# Patient Record
Sex: MALE | Race: ASIAN | NOT HISPANIC OR LATINO
[De-identification: names, ages, dates, MRNs, and addresses within clinical notes are randomized per-mention and may not be internally consistent; named-entity substitution may affect disease eponyms.]

---

## 2022-03-08 PROBLEM — Z00.00 ENCOUNTER FOR PREVENTIVE HEALTH EXAMINATION: Status: ACTIVE | Noted: 2022-03-08

## 2022-03-09 ENCOUNTER — APPOINTMENT (OUTPATIENT)
Dept: CARDIOLOGY | Facility: CLINIC | Age: 67
End: 2022-03-09
Payer: MEDICARE

## 2022-03-09 VITALS
RESPIRATION RATE: 18 BRPM | BODY MASS INDEX: 23.5 KG/M2 | HEIGHT: 70.47 IN | OXYGEN SATURATION: 96 % | HEART RATE: 88 BPM | DIASTOLIC BLOOD PRESSURE: 84 MMHG | TEMPERATURE: 97.3 F | WEIGHT: 166 LBS | SYSTOLIC BLOOD PRESSURE: 137 MMHG

## 2022-03-09 DIAGNOSIS — I10 ESSENTIAL (PRIMARY) HYPERTENSION: ICD-10-CM

## 2022-03-09 DIAGNOSIS — R07.89 OTHER CHEST PAIN: ICD-10-CM

## 2022-03-09 PROCEDURE — 99204 OFFICE O/P NEW MOD 45 MIN: CPT

## 2022-03-10 NOTE — REASON FOR VISIT
[FreeTextEntry1] : 66 year-old male with HTN, HLD presents for evaluation of abnormal ECG. Patient reports that 2 weeks ago he felt upper CP with inspiration, not related to exertion, pain was tolerable and vague. Patient denies SOB while walking but reports that he had FARIAS when he had stress test in the past. Patient denies palpitations. Patient denies h/o syncope. Patient saw PCP 3 days later, ECG showed NSR, LAD. Patient reports he has leg numbness and tightness and cannot walk well and uses a cane. He has seen neurologist without diagnosis. I advised patient to undergo an echocardiogram. I advised patient to get nuclear stress test pending insurance authorization.

## 2022-03-28 PROBLEM — I10 HTN (HYPERTENSION): Status: ACTIVE | Noted: 2022-03-28

## 2022-03-28 PROBLEM — R07.89 CHEST DISCOMFORT: Status: ACTIVE | Noted: 2022-03-23

## 2022-03-29 ENCOUNTER — APPOINTMENT (OUTPATIENT)
Dept: CARDIOLOGY | Facility: CLINIC | Age: 67
End: 2022-03-29
Payer: MEDICARE

## 2022-03-29 VITALS — DIASTOLIC BLOOD PRESSURE: 82 MMHG | SYSTOLIC BLOOD PRESSURE: 138 MMHG | TEMPERATURE: 97.6 F

## 2022-03-29 DIAGNOSIS — M79.606 PAIN IN LEG, UNSPECIFIED: ICD-10-CM

## 2022-03-29 PROCEDURE — 93922 UPR/L XTREMITY ART 2 LEVELS: CPT

## 2022-03-29 PROCEDURE — 93306 TTE W/DOPPLER COMPLETE: CPT

## 2022-03-31 PROBLEM — M79.606 LEG PAIN: Status: ACTIVE | Noted: 2022-03-31

## 2023-03-28 ENCOUNTER — APPOINTMENT (OUTPATIENT)
Dept: CARDIOLOGY | Facility: CLINIC | Age: 68
End: 2023-03-28